# Patient Record
(demographics unavailable — no encounter records)

---

## 2025-05-30 NOTE — PHYSICAL EXAM
[Chaperoned Physical Exam] : A chaperone was present in the examining room during all aspects of the physical examination. [Appropriately responsive] : appropriately responsive [Alert] : alert [No Acute Distress] : no acute distress [No Lymphadenopathy] : no lymphadenopathy [Regular Rate Rhythm] : regular rate rhythm [No Murmurs] : no murmurs [Clear to Auscultation B/L] : clear to auscultation bilaterally [Soft] : soft [Non-tender] : non-tender [Non-distended] : non-distended [No HSM] : No HSM [No Lesions] : no lesions [No Mass] : no mass [Oriented x3] : oriented x3 [Examination Of The Breasts] : a normal appearance [No Masses] : no breast masses were palpable [Labia Majora] : normal [Labia Minora] : normal [Normal] : normal [Uterine Adnexae] : normal [FreeTextEntry2] : Bj Brooks [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

## 2025-05-30 NOTE — PLAN
[FreeTextEntry1] : #HCM -Breast self exam reviewed and taught -Declines STI Screening today -Pap collected today -Immunizations: UTD  #Amenorrhea, secondary -Has outside life stress since new job and newly  -ddx: anovulatory bleeding vs. PCOS -office UCG neg -PCOS labs drawn today -Depending on bloodwork, fu and plan to start OCP vs. provera for withdrawal bleed  #Contraception counseling -C/w condom use   Telemed in 2wks to fu on bloodwork and RTO in 1 year for annual or PRN for any GYN complaints ANGELICA Javier MD

## 2025-05-30 NOTE — END OF VISIT
[FreeTextEntry3] : This note was written by Bj Brooks on 05/30/2025 actively solely DANIELA Medley M.D. All medical record entries made by the Scribe were at my, DANIELA Medley M.D. direction and personally dictated by me on 05/30/2025. I have personally reviewed the chart and agree that the record reflects my personal performance of the history, physical exam, assessment, and plan.

## 2025-05-30 NOTE — HISTORY OF PRESENT ILLNESS
[FreeTextEntry1] : 05/30/2025. EBONI COLÓN 26 year old female G0 LMP 4/1/25 presents for an annual gyn exam. Her last annual was 1/2024. No significant PMH.   She recently had irregular menses, they have not occurred monthly since 4/1/25. Her menses are not too heavy or painful. She has not had a period in almost 2 mos. She reports this is the first time her menses has been delayed, having monthly menses prior to 4/2025. Her at home pregnancy tests were negative. UCG in office today was negative.  TVUS today - anteverted uterus, endometrium 11.96mm, R ovary limited view but nml, L ovary small peripheral follicles.  She also presents with undesired weight gain. She has tried different workout programs and pilates exercise but has not seen much improvement. She is wondering if her weight gain is hormonal.  She reports having increased stress in life due to getting  recently, getting a new job, and gaining undesired weight.  No vaginal discharge or vaginitis symptoms. She denies abdominal or pelvic pain. No urinary complaints. BM is normal per patient. Denies changes in medical status, medications, serious illness, hospitalizations, and surgeries.   OBHx: G0 GynHx: denies PMH: denies SHx: denies Meds: denies All: NKDA Soc: Soc alcohol use. No T/D. FHx: PGF w/ Colon ca onset age late 60s. Denies FHx of breast, ovarian, uterine, pancreatic, or prostate cancer. Psych: negative PHQ9 = 2 Immunizations: UTD [PapSmeardate] : 1/24 [TextBox_31] : DAVID